# Patient Record
Sex: FEMALE | Race: BLACK OR AFRICAN AMERICAN
[De-identification: names, ages, dates, MRNs, and addresses within clinical notes are randomized per-mention and may not be internally consistent; named-entity substitution may affect disease eponyms.]

---

## 2017-02-24 ENCOUNTER — HOSPITAL ENCOUNTER (EMERGENCY)
Dept: HOSPITAL 74 - JER | Age: 37
Discharge: HOME | End: 2017-02-24
Payer: COMMERCIAL

## 2017-02-24 VITALS — SYSTOLIC BLOOD PRESSURE: 109 MMHG | DIASTOLIC BLOOD PRESSURE: 68 MMHG | TEMPERATURE: 97.8 F | HEART RATE: 98 BPM

## 2017-02-24 VITALS — BODY MASS INDEX: 23.8 KG/M2
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
## 2017-02-24 DIAGNOSIS — S61.213A: Primary | ICD-10-CM

## 2017-02-24 PROCEDURE — 0HQGXZZ REPAIR LEFT HAND SKIN, EXTERNAL APPROACH: ICD-10-PCS

## 2017-03-08 ENCOUNTER — HOSPITAL ENCOUNTER (EMERGENCY)
Dept: HOSPITAL 74 - JERFT | Age: 37
Discharge: HOME | End: 2017-03-08
Payer: COMMERCIAL

## 2017-03-08 VITALS — DIASTOLIC BLOOD PRESSURE: 65 MMHG | TEMPERATURE: 97.8 F | SYSTOLIC BLOOD PRESSURE: 95 MMHG | HEART RATE: 81 BPM

## 2017-03-08 VITALS — BODY MASS INDEX: 22.8 KG/M2

## 2017-03-08 DIAGNOSIS — Z48.02: Primary | ICD-10-CM

## 2017-03-08 NOTE — PDOC
Suture Removal/Wound Check HPI





- History of Present Illness


Chief Complaint: Suture/Staple Removal(Here)


Stated Complaint: STITCHES REMOVAL


Time Seen by Provider: 03/08/17 18:21


History Source: Yes: Patient


Exam Limitations: Yes: No Limitations


Treated at: [facility]


Date of Last ED visit: 02/24/17





- Previous ED Treatment


Type of procedure performed on last visit: Yes: Laceration Repair


Tetanus Immunization: Yes: Up to Date


Antibiotics Prescribed: No





- Onset of Previous Treatment


Date of Occurence: 02/24/17


Comment:: 


35 yo F presents for suture removal. She states that there has been some pain 

to the area. No purulent drainage. She slammed the same finger in a door a few 

days ago and noted a little bleeding. 





Past History





- Past Medical History


Allergies/Adverse Reactions: 


Allergies





No Known Allergies Allergy (Verified 03/08/17 18:19)


 








Home Medications: 


Ambulatory Orders





No Home Medications 0 dose .ROUTE UTDICT 09/19/13 











- Reproductive History


LMP: 09/19/13





- Immunization History


Tetanus Status: More than 5 years





- Social History


Smoking History: No


Smoking Status: Never smoked


Number of Ciarettes Per Day: 0


Alcohol Use: occasionally


Drug Use: none





Suture Removal/Wound Check PE





- Physical Exam


Laceration/Wound Check Symptoms: reports: Pain, Bleeding, Improved


Comments: 


GENERAL: Well appearing. 


SKIN: Healing L 3rd finger laceration. No drainage. 





Location of Laceration/Wound: left: Finger





*Review of Systems





- Review of Systems


Able to Perform ROS?: Yes


Comments:: 


GENERAL/CONSTITUTIONAL: No fever or chills. No weakness.


MUSCULOSKELETAL: No joint or muscle swelling or pain. No neck or back pain.


SKIN: No rash. +Healing L finger lac. 


NEUROLOGIC: No headache, vertigo, loss of consciousness, or change in strength/

sensation.








Procedures





- Additional Procedures


Additional Procedures: other


Progress: 





03/08/17 18:34


7 Sutures removed. Of note, the sutures were greasy with neosporin that the 

patient had applied. Small piece of the suture remained in the wound, unable to 

remove it. Counseled patient to let the wound dry. 





Medical Decision Making





- Medical Decision Making





03/08/17 18:51


Attempted to call patient to  to reexamine the wound and attempt to remove 

the small piece of suture, she had already departed ED despite being asked to 

wait for DC paperwork. 








*DC/Admit/Observation/Transfer


Diagnosis at time of Disposition: 


 Visit for suture removal





- Discharge Dispostion


Disposition: HOME


Condition at time of disposition: Stable


Admit: No

## 2020-07-10 ENCOUNTER — HOSPITAL ENCOUNTER (EMERGENCY)
Dept: HOSPITAL 74 - JER | Age: 40
Discharge: HOME | End: 2020-07-10
Payer: COMMERCIAL

## 2020-07-10 VITALS — TEMPERATURE: 98.6 F | HEART RATE: 83 BPM | SYSTOLIC BLOOD PRESSURE: 102 MMHG | DIASTOLIC BLOOD PRESSURE: 62 MMHG

## 2020-07-10 VITALS — BODY MASS INDEX: 20.9 KG/M2

## 2020-07-10 DIAGNOSIS — K59.00: Primary | ICD-10-CM

## 2020-07-10 LAB
APPEARANCE UR: CLEAR
BACTERIA # UR AUTO: 369 /UL (ref 0–1359)
BILIRUB UR STRIP.AUTO-MCNC: NEGATIVE MG/DL
CASTS URNS QL MICRO: 1 /UL (ref 0–3.1)
COLOR UR: YELLOW
EPITH CASTS URNS QL MICRO: 18 /UL (ref 0–25.1)
KETONES UR QL STRIP: NEGATIVE
LEUKOCYTE ESTERASE UR QL STRIP.AUTO: (no result)
NITRITE UR QL STRIP: NEGATIVE
PH UR: 6 [PH] (ref 5–8)
PROT UR QL STRIP: NEGATIVE
PROT UR QL STRIP: NEGATIVE
RBC # BLD AUTO: 4 /UL (ref 0–23.9)
SP GR UR: 1.02 (ref 1.01–1.03)
UROBILINOGEN UR STRIP-MCNC: 0.2 MG/DL (ref 0.2–1)
WBC # UR AUTO: 30 /UL (ref 0–25.8)

## 2020-07-10 NOTE — PDOC
Attending Attestation





- Resident


Resident Name: Dequan Fitch





- ED Attending Attestation


I have performed the following: I have examined & evaluated the patient, The 

case was reviewed & discussed with the resident, I agree w/resident's findings &

plan, Exceptions are as noted





- HPI


HPI: 





07/10/20 12:08


see below





- Physicial Exam


PE: 





07/10/20 12:01


GENERAL: The patient is awake, alert, and fully oriented, Nontoxic - in no acute

distress.


ABDOMEN: Soft, nontender, No guarding, no rebound.  No CVA tenderness








- Medical Decision Making





07/10/20 11:00


39y F presents with complain tof lower abdominl pain and constipation, p noted 

some blood on her toilet paper today.  Patient notes that approximately 1 week 

she has been having difficulty having a bowel movement and has been having very 

hard stools. stool is brown, no blood on stool, blood onl on tisue and she feels

a small mass when she whiped. 





also notes some dysuria/frequency. 


Patient does endorse changing her diet as she has been working longer hours and 

has been eating unhealthfully which is atypical for her. 


Patient denies any fever, chills, nausea, vomiting





on exam pt well apering no distress


abd: soft nontender








suspect constipation, possible hemorrhoidal 


recommend diet changes, fiber, hydration


abd soft nonender dont think thre is an acute surgical process


ua sugestive of uti


will treat with abx


return precautions were discusse


d








Discharge





- Discharge Information


Problems reviewed: Yes


Clinical Impression/Diagnosis: 


Constipation


Qualifiers:


 Constipation type: unspecified constipation type Qualified Code(s): K59.00 - 

Constipation, unspecified





Condition: Good


Disposition: HOME





- Additional Discharge Information


Prescriptions: 


Glycerin Suppository Adult - 1 each RC DAILY PRN #7 supp.rect


 PRN Reason: Constipation


Nitrofurantoin Macrocrystal [Nitrofurantoin] 100 mg PO BID 5 Days #10 capsule





- Follow up/Referral


Referrals: 


Bryan Awad [Primary Care Provider] - 





- Patient Discharge Instructions


Patient Printed Discharge Instructions:  DI for Urinary Tract Infection (UTI), 

DI for Constipation


Additional Instructions: 


You were seen in the emergency department for symptoms of constipation and 

rectal bleeding. You were given a urinalysis which showed evidence of a UTI.  

You also received a urine pregnancy test which was negative. You also received a

rectal exam which revealed no abnormalities. You were prescribed antibiotics to 

treat your UTI. You were considered medically stable and discharged with 

instructions to maintain a healthy diet high in fiber, drink plenty of fluids, 

and exercise regularly. If your symptoms persist you can take an over the 

counter stool softener such as Colace. Please return to the emergency department

if your symptoms worsen, you find excessive blood in your stool, you begin 

vomiting, or have fever.  





- Post Discharge Activity


Work/Back to School Note:  Back to Work

## 2020-07-10 NOTE — PDOC
History of Present Illness





- General


Chief Complaint: Pain, Acute


Stated Complaint: ABD PAIN


Time Seen by Provider: 07/10/20 09:00





- History of Present Illness


Initial Comments: 





39 YOF h/o eczema presents with nausea, constipation, rectal bleeding, back and 

suprapubic pain of 4 days duration. Per patient she began to experience some 

rectal pain and abdominal discomfort 4 days prior to arrival. 3 days ago she 

began to have difficulty w/ bowel movements, feeling that her stool was very 

hard and difficult/painful to pass. After passing stool she noted blood on the 

toilet paper. Her abdominal, lower back, and rectal pain are continuous however 

are exacerbated when attempting to defecate. She has used topical application of

coconut oil to her anus in an attempt to relieve her symptoms, however she 

reports that this has not helped. She delivered two children to term w/o 

complication. She has no history of abdominal surgery. Her last BM was yesterday

and she is still passing flatus. She denies urinary symptoms. She denies 

diarrhea, vomiting, CP, and SOB, fever or chills. 


07/10/20 15:21








Past History





- Travel History


Traveled outside of the country in the last 30 days: No


Close contact w/someone who was outside of country & ill: No





- Medical History


Allergies/Adverse Reactions: 


                                    Allergies











Allergy/AdvReac Type Severity Reaction Status Date / Time


 


No Known Allergies Allergy   Verified 07/10/20 08:02











Home Medications: 


Ambulatory Orders





No Home Medications 0 dose .ROUTE UTDICT 09/19/13 


Glycerin Suppository Adult - 1 each RC DAILY PRN #7 supp.rect 07/10/20 


Nitrofurantoin Macrocrystal [Nitrofurantoin] 100 mg PO BID 5 Days #10 capsule 

07/10/20 








COPD: No





- Psycho-Social/Smoking History


Smoking Status: No


Smoking History: Current some day smoker


Have you smoked in the past 12 months: Yes


Number of Cigarettes Smoked Daily: 0


Information on smoking cessation initiated: Yes


'Breaking Loose' booklet given: 03/08/17





- Substance Abuse Hx (Audit-C & DAST Scrn)


How often the patient has a drink containing alcohol: Monthly or less


Score: In Men: 4 or > Positive; In Women: 3 or > Positive: 1


Screen Result (Pos requires Nsg. Audit-10AR): Negative


In the last yr the pt used illegal drug/Rx for NonMed reason: No


Score:  Yes response is considered Positive: 0


Screen Result (Positive result requires Nsg. DAST-10): Negative





**Review of Systems





- Review of Systems


Constitutional: Yes: See HPI


HEENTM: Yes: See HPI


Respiratory: Yes: See HPI


Cardiac (ROS): Yes: See HPI


ABD/GI: Yes: See HPI


: Yes: See HPI


Musculoskeletal: Yes: See HPI


Integumentary: Yes: See HPI


Neurological: Yes: See HPI


Endocrine: Yes: See HPI


Hematologic/Lymphatic: Yes: See HPI





*Physical Exam





- Vital Signs


                                Last Vital Signs











Temp Pulse Resp BP Pulse Ox


 


 98.6 F   83   18   102/62   100 


 


 07/10/20 07:55  07/10/20 07:55  07/10/20 07:55  07/10/20 07:55  07/10/20 07:55














- Physical Exam


General Appearance: Yes: Nourished, Appropriately Dressed


Respiratory/Chest: positive: Lungs Clear, Normal Breath Sounds, Respiratory 

Distress


Cardiovascular: positive: Regular Rhythm, Regular Rate, S1, S2


Gastrointestinal/Abdominal: positive: Normal Bowel Sounds, Tender, Flat, Soft





ED Treatment Course





- ADDITIONAL ORDERS


Additional order review: 


                               Laboratory  Results











  07/10/20





  08:25


 


Urine HCG, Qual  Negative














Medical Decision Making





- Medical Decision Making





39 YOF h/o eczema presents with nausea, constipation, rectal bleeding, back and 

suprapubic pain of 4 days duration, exacerbated when attempting to defecate. She

 has no history of abdominal surgery. Her last BM was yesterday and she is still

 passing flatus. She denies urinary symptoms. She denies diarrhea, vomiting, CP,

 and SOB, fever or chills. Vital stable on arrival, exam revealing only of 

suprapubic pain to palpation. 





ddx includes but is not limited to constipation, hemorrhoids, anal fissure, UTI.

 





plan: UA, Urine pregnancy 





Reassess: UA revealing of 1+ leukocyte esterase, will give 1 dose nitrofurantoin

 in ED and discharge with prescription for abx and glycerin suppository. 





Dispo: DC to home with instructions to take prescribed meds, drink fluids, eat 

foods high in fiber, and continue physical activity. 














Discharge





- Discharge Information


Problems reviewed: Yes


Clinical Impression/Diagnosis: 


Constipation


Qualifiers:


 Constipation type: unspecified constipation type Qualified Code(s): K59.00 - 

Constipation, unspecified





Condition: Good


Disposition: HOME





- Admission


No





- Additional Discharge Information


Prescriptions: 


Glycerin Suppository Adult - 1 each RC DAILY PRN #7 supp.rect


 PRN Reason: Constipation


Nitrofurantoin Macrocrystal [Nitrofurantoin] 100 mg PO BID 5 Days #10 capsule





- Follow up/Referral


Referrals: 


Bryan Awad [Primary Care Provider] - 





- Patient Discharge Instructions


Patient Printed Discharge Instructions:  DI for Urinary Tract Infection (UTI), 

DI for Constipation


Additional Instructions: 


You were seen in the emergency department for symptoms of constipation and 

rectal bleeding. You were given a urinalysis which showed evidence of a UTI.  

You also received a urine pregnancy test which was negative. You also received a

rectal exam which revealed no abnormalities. You were prescribed antibiotics to 

treat your UTI. You were considered medically stable and discharged with 

instructions to maintain a healthy diet high in fiber, drink plenty of fluids, 

and exercise regularly. If your symptoms persist you can take an over the 

counter stool softener such as Colace. Please return to the emergency department

if your symptoms worsen, you find excessive blood in your stool, you begin 

vomiting, or have fever.  





- Post Discharge Activity


Work/Back to School Note:  Back to Work

## 2020-09-29 ENCOUNTER — HOSPITAL ENCOUNTER (EMERGENCY)
Dept: HOSPITAL 74 - JER | Age: 40
Discharge: HOME | End: 2020-09-29
Payer: COMMERCIAL

## 2020-09-29 VITALS — SYSTOLIC BLOOD PRESSURE: 138 MMHG | TEMPERATURE: 98.9 F | DIASTOLIC BLOOD PRESSURE: 72 MMHG | HEART RATE: 79 BPM

## 2020-09-29 VITALS — BODY MASS INDEX: 210.3 KG/M2

## 2020-09-29 DIAGNOSIS — S61.411A: Primary | ICD-10-CM

## 2020-09-29 PROCEDURE — 3E0234Z INTRODUCTION OF SERUM, TOXOID AND VACCINE INTO MUSCLE, PERCUTANEOUS APPROACH: ICD-10-PCS

## 2020-09-29 NOTE — XMS
Summarization Of Episode

                          Created on:2020



Patient:VASQUEZ HOANG

Sex:Female

:1980

External Reference #:[id number]





Demographics







                          Address                   [address]



                                                    [place] [address]

 

                          Home Phone                [phone]

 

                          Work Phone                [phone]

 

                          Preferred Language        English

 

                          Marital Status            Unknown

 

                          Mosque Affiliation     BA

 

                          Race                      BL

 

                          Ethnic Group              Unknown









Author







                          Organization              [facility]









Support







                Name            Relationship    Address         Phone

 

                [employer] (U[employer]) Unavailable     [address] 

[phone]



                                                [place] [address] 

 

                [employer]             Unavailable     [address] [phone]



                                                [place] [address] 

 

                HELEN RADHA   FRIEND          [address] [phone]

[phone]



                                                [place] [address] 

 

                LENNY, MAYCOL SISTER          [address] [phone]



                                                [place] [address] 

 

                LENNY, MAYCOL Sister          [address] Unavailable



                                                [place] [address] 









Re-disclosure Warning

The records that you are about to access may contain information from federally-
assisted alcohol or drug abuse programs. If such information is present, then 
the following federally mandated warning applies: This information has been 
disclosed to you from records protected by federal confidentiality rules (42 CFR
part 2). The federal rules prohibit you from making any further disclosure of 
this information unless further disclosure is expressly permitted by the written
consent of the person to whom it pertains or as otherwise permitted by 42 CFR 
part 2. A general authorization for the release of medical or other information 
is NOT sufficient for this purpose. The Federal rules restrict any use of the 
information to criminally investigate or prosecute any alcohol or drug abuse 
patient.The records that you are about to access may contain highly sensitive 
health information, the redisclosure of which is protected by Article 27-F of 
the [facility] Public Health law. If you continue you may haveaccess to 
information: Regarding HIV / AIDS; Provided by facilities licensed or operated 
by the [facility] Office of Mental Health; or Provided by the [facility]
Office for People With Developmental Disabilities. If such information is 
present, then the following [place] mandated warning applies: This 
information has been disclosed to you from confidential records which are 
protected by state law. State law prohibits you from making any further 
disclosure of this information without the specific written consent of the 
person to whom it pertains, or as otherwise permitted by law. Any unauthorized 
further disclosure in violation of state law may result in a fine or care home 
sentence or both. A general authorization for the release of medical or other 
information is NOT sufficient authorization for further disclosure.



Insurance Providers







          Payer name Policy type / Policy ID Covered   Covered party's Policy   

 Plan



                    Coverage type           party ID  relationship to [employer]    

Information



                                                  puente              

 

          HIP CMO MD           [id number]           SP                  [id number]

[id number]



                              1

## 2020-09-29 NOTE — PDOC
History of Present Illness





- General


Chief Complaint: Injury


Stated Complaint: L HAND LAC


Time Seen by Provider: 09/29/20 05:52


History Source: Patient





- History of Present Illness


Initial Comments: 





09/29/20 05:52


39-year-old female reports that she cut her right hand on a metal license plate 

at 10 PM last night.  Patient reports that she washed her wound with wound wash 

and water thoroughly.  Patient reports that she is concerned that her hand will 

get infected and was advised to by mother to have a tetanus vaccine.  Patient 

has full ROM to fingers has sensation and able to make a fist.


No past medical history


Last tetanus unknown





Past History





- Medical History


Allergies/Adverse Reactions: 


                                    Allergies











Allergy/AdvReac Type Severity Reaction Status Date / Time


 


No Known Allergies Allergy   Verified 09/29/20 05:52











Home Medications: 


Ambulatory Orders





No Home Medications 0 dose .ROUTE UTDICT 09/19/13 


Glycerin Suppository Adult - 1 each RC DAILY PRN #7 supp.rect 07/10/20 


Nitrofurantoin Macrocrystal [Nitrofurantoin] 100 mg PO BID 5 Days #10 capsule 

07/10/20 


Cephalexin Monohydrate [Keflex -] 250 mg PO Q8H #21 capsule 09/29/20 








COPD: No





- Psycho-Social/Smoking History


Smoking Status: No


Smoking History: Current some day smoker


Have you smoked in the past 12 months: Yes


Number of Cigarettes Smoked Daily: 0


'Breaking Loose' booklet given: 03/08/17





**Review of Systems





- Review of Systems


Able to Perform ROS?: Yes


Is the patient limited English proficient: No


Integumentary: Yes: Other (Laceration)





*Physical Exam





- Vital Signs





09/29/20 05:53


                                Last Vital Signs











Temp Pulse Resp BP Pulse Ox


 


 98.9 F   79   20   138/72   100 


 


 09/29/20 05:49  09/29/20 05:49  09/29/20 05:49  09/29/20 05:49  09/29/20 05:49














- Physical Exam


General Appearance: Yes: Appropriately Dressed


Integumentary: positive: Other (2 cm laceration to palm of right hand proximal 

to 4th and 5th digit. full sensation and rom to fingers. 1 cm abrasion to palm 

of right , able to make a fist finger warm to touch. )


Neurologic: positive: Fully Oriented, Alert





Medical Decision Making





- Medical Decision Making





09/29/20 06:02


A: hand laceration





P: Wound cleaned bacitracin applied.  Patient was given the first dose of 

cephalexin in the ED.


Discussed with patient at extent of the importance of following up with hand 

surgery for wound checks and appropriate wound healing.  Patient verbalized 

understanding.  Strict return precautions were reviewed with patient.





Discharge





- Discharge Information


Problems reviewed: Yes


Clinical Impression/Diagnosis: 


Laceration of right hand


Qualifiers:


 Encounter type: initial encounter Foreign body presence: without foreign body 

Qualified Code(s): S61.411A - Laceration without foreign body of right hand, 

initial encounter





Condition: Fair


Disposition: HOME





- Additional Discharge Information


Prescriptions: 


Cephalexin Monohydrate [Keflex -] 250 mg PO Q8H #21 capsule





- Follow up/Referral


Referrals: 


Bryan Awad [Primary Care Provider] - 


John Pagan MD [Staff Physician] - Call tomorrow





- Patient Discharge Instructions


Patient Printed Discharge Instructions:  DI for Minor Laceration


Additional Instructions: 


Keep wound clean and dry.  Apply bacitracin to the area.  


It is important that you follow-up with a hand surgeon as soon as possible. 


Take cephalexin as prescribed.


 a referral name has been given to you.


Return to the emergency room for any worsening symptoms





- Post Discharge Activity


Work/Back to School Note:  Back to Work